# Patient Record
(demographics unavailable — no encounter records)

---

## 2025-07-08 NOTE — REVIEW OF SYSTEMS
[Feeling Tired] : feeling tired [As Noted in HPI] : as noted in HPI [Negative] : Heme/Lymph [Chest Pain] : no chest pain [SOB on Exertion] : no shortness of breath during exertion [Orthopnea] : no orthopnea [Abdominal Pain] : no abdominal pain [Dizziness] : no dizziness [FreeTextEntry3] : eyeglasses

## 2025-07-08 NOTE — ASSESSMENT
[FreeTextEntry1] : Ms. Briseno has severe aortic stenosis on TTE. Cardiac CT reviewed, anatomically suitable for transfemoral TAVR. We have discussed risks/benefits of TAVR including but not limited to stroke, bleeding, vascular access complications, and need for PPM. She will be scheduled for angiogram with Dr. Blankenship to complete her evaluation. She and her aid are aware that she will need to hold her Farxiga three days prior to angiogram and TAVR. She is agreeable to proceed and will be scheduled for TAVR.

## 2025-07-08 NOTE — DATA REVIEWED
[FreeTextEntry1] : 2/8/25 TTE LVEF 75%, JENNIFER 0.6 sqcm, mGr 37mmHg, pGr 62mmHg, AoV 3.9m/s  7/7/25 Cardiac CT completed, results pending

## 2025-07-08 NOTE — HISTORY OF PRESENT ILLNESS
[FreeTextEntry1] : Ms. Briseno is a 93-year-old female, referred by Steve Knowles and Krzysztof for evaluation of aortic stenosis. Her past medical history includes HTN, CKD (followed by nephrologist Dr. Mathew Valiente), T2DM, iron deficiency anemia, CVA in 2020, Hepatitis B, Green's esophagus, and thyroid nodule.   She reports her primary symptom as fatigue with decreased activity tolerance. She lives alone, she had one daughter that passed away from uterine cancer. She has a HHA 5 days per week. She is independent in all ADLs. She has no dyspnea, angina, PND, or orthopnea. She has significant BLE edema, she wears compression stockings and uses a compression pump once daily for an hour prescribed by Dr. Knowles.   TTE in February demonstrated severe AS, she underwent cardiac CT on 7/7 as part of her TAVR evaluation, results are pending.

## 2025-07-08 NOTE — PHYSICAL EXAM
[General Appearance - Alert] : alert [Sclera] : the sclera and conjunctiva were normal [Hearing Threshold Finger Rub Not [place]] : hearing was normal [Neck Appearance] : the appearance of the neck was normal [Respiration, Rhythm And Depth] : normal respiratory rhythm and effort [Auscultation Breath Sounds / Voice Sounds] : lungs were clear to auscultation bilaterally [Systolic grade ___/6] : A grade [unfilled]/6 systolic murmur was heard. [Examination Of The Chest] : the chest was normal in appearance [Bowel Sounds] : normal bowel sounds [Abdomen Soft] : soft [No CVA Tenderness] : no ~M costovertebral angle tenderness [Skin Turgor] : normal skin turgor [Sensation] : the sensory exam was normal to light touch and pinprick [No Focal Deficits] : no focal deficits [Oriented To Time, Place, And Person] : oriented to person, place, and time [FreeTextEntry1] : ambulates with a walker